# Patient Record
Sex: MALE
[De-identification: names, ages, dates, MRNs, and addresses within clinical notes are randomized per-mention and may not be internally consistent; named-entity substitution may affect disease eponyms.]

---

## 2017-10-05 ENCOUNTER — HOSPITAL ENCOUNTER (EMERGENCY)
Dept: HOSPITAL 31 - C.ER | Age: 57
Discharge: LEFT BEFORE DISCHARGE | End: 2017-10-05
Payer: MEDICARE

## 2017-10-05 VITALS
HEART RATE: 88 BPM | RESPIRATION RATE: 18 BRPM | SYSTOLIC BLOOD PRESSURE: 144 MMHG | DIASTOLIC BLOOD PRESSURE: 83 MMHG | TEMPERATURE: 97.8 F

## 2017-10-05 VITALS — BODY MASS INDEX: 28.6 KG/M2

## 2017-10-05 DIAGNOSIS — Z21: ICD-10-CM

## 2017-10-05 DIAGNOSIS — S00.93XA: Primary | ICD-10-CM
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
## 2019-02-06 ENCOUNTER — HOSPITAL ENCOUNTER (EMERGENCY)
Dept: HOSPITAL 31 - C.ER | Age: 59
Discharge: HOME | End: 2019-02-06
Payer: COMMERCIAL

## 2019-02-06 VITALS
TEMPERATURE: 98.7 F | SYSTOLIC BLOOD PRESSURE: 142 MMHG | RESPIRATION RATE: 20 BRPM | OXYGEN SATURATION: 96 % | DIASTOLIC BLOOD PRESSURE: 98 MMHG | HEART RATE: 98 BPM

## 2019-02-06 VITALS — BODY MASS INDEX: 28.6 KG/M2

## 2019-02-06 DIAGNOSIS — Z21: ICD-10-CM

## 2019-02-06 DIAGNOSIS — S43.102A: Primary | ICD-10-CM
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
## 2019-02-06 NOTE — RAD
HISTORY:

 cp s/p trauma 



COMPARISON:

None available. 



TECHNIQUE:

Chest PA and lateral



FINDINGS:





LUNGS:

The left costophrenic angles excluded from view.  No focal 

consolidation.



Please note that chest x-ray has limited sensitivity for the 

detection of pulmonary masses.



PLEURA:

No significant pleural effusion identified. No definite pneumothorax .



CARDIOVASCULAR:

The cardiomediastinal silhouette appears within normal limits of 

size.  Atherosclerotic calcification of the aortic knob present. 



OSSEOUS STRUCTURES:

No acute osseous abnormality identified.



VISUALIZED UPPER ABDOMEN:

Unremarkable.



OTHER FINDINGS:

None.



IMPRESSION:

No focal consolidation.

## 2019-02-06 NOTE — RAD
PROCEDURE:  Radiographs of the Right Shoulder



HISTORY:

trauma



COMPARISON:

None available.



FINDINGS:



BONES:

No acute displaced fracture.  Degenerative changes including 

subchondral cyst at the humeral head.  The distal clavicle and 

underlying ribs appear intact. 



JOINTS:

No acute dislocation.  Glenohumeral joint space narrowing. 



SOFT TISSUES:

Soft tissues appear unremarkable.



No evidence of radiopaque foreign body.



IMPRESSION:

No acute displaced fracture or dislocation evident. 



If symptoms persist or if there is continued clinical concern, x-ray 

follow-up in 7-10 days should be considered.

## 2019-02-06 NOTE — RAD
Date of service: 02/06/2019



PROCEDURE:  Cervical Spine Radiographs.



HISTORY:

Pain. 



COMPARISON:

None available. 



FINDINGS:

Alignment maintained.  Multilevel degenerative changes including 

intervertebral disc space narrowing, vacuum disc phenomenon, and 

osteophyte formation.  Base of the dens is poorly visualized.  No 

acute displaced fracture identified.  No prevertebral soft tissue 

swelling.  Calcifications within the right neck presumably carotid 

artery calcifications. 



IMPRESSION:

Base of the dens is poorly visualized.  Multilevel degenerative 

changes.  No acute displaced fracture identified.  Given limitations 

of this examination, CT cervical spine is recommended if high 

clinical index of suspicion for fracture. 



Multilevel degenerative changes.
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
## 2022-07-21 PROBLEM — Z00.00 ENCOUNTER FOR PREVENTIVE HEALTH EXAMINATION: Status: ACTIVE | Noted: 2022-07-21

## 2022-07-28 ENCOUNTER — APPOINTMENT (OUTPATIENT)
Dept: INFECTIOUS DISEASE | Facility: CLINIC | Age: 62
End: 2022-07-28

## 2022-07-28 ENCOUNTER — OUTPATIENT (OUTPATIENT)
Dept: OUTPATIENT SERVICES | Facility: HOSPITAL | Age: 62
LOS: 1 days | Discharge: HOME | End: 2022-07-28

## 2022-07-28 VITALS
WEIGHT: 220 LBS | HEART RATE: 85 BPM | BODY MASS INDEX: 29.8 KG/M2 | OXYGEN SATURATION: 99 % | TEMPERATURE: 98.6 F | SYSTOLIC BLOOD PRESSURE: 113 MMHG | RESPIRATION RATE: 16 BRPM | DIASTOLIC BLOOD PRESSURE: 81 MMHG | HEIGHT: 72 IN

## 2022-07-28 DIAGNOSIS — F32.A DEPRESSION, UNSPECIFIED: ICD-10-CM

## 2022-07-28 DIAGNOSIS — E78.5 HYPERLIPIDEMIA, UNSPECIFIED: ICD-10-CM

## 2022-07-28 DIAGNOSIS — K59.00 CONSTIPATION, UNSPECIFIED: ICD-10-CM

## 2022-07-28 DIAGNOSIS — F43.10 POST-TRAUMATIC STRESS DISORDER, UNSPECIFIED: ICD-10-CM

## 2022-07-28 DIAGNOSIS — I10 ESSENTIAL (PRIMARY) HYPERTENSION: ICD-10-CM

## 2022-07-28 DIAGNOSIS — B20 HUMAN IMMUNODEFICIENCY VIRUS [HIV] DISEASE: ICD-10-CM

## 2022-07-28 DIAGNOSIS — I63.9 CEREBRAL INFARCTION, UNSPECIFIED: ICD-10-CM

## 2022-07-28 DIAGNOSIS — F41.9 ANXIETY DISORDER, UNSPECIFIED: ICD-10-CM

## 2022-07-28 PROCEDURE — 99204 OFFICE O/P NEW MOD 45 MIN: CPT

## 2022-07-28 RX ORDER — ATORVASTATIN CALCIUM 40 MG/1
40 TABLET, FILM COATED ORAL
Qty: 30 | Refills: 5 | Status: ACTIVE | COMMUNITY
Start: 2022-07-28 | End: 1900-01-01

## 2022-07-28 RX ORDER — DOCUSATE SODIUM 100 MG/1
100 CAPSULE, LIQUID FILLED ORAL TWICE DAILY
Qty: 60 | Refills: 5 | Status: ACTIVE | COMMUNITY
Start: 2022-07-28 | End: 1900-01-01

## 2022-07-28 RX ORDER — OLANZAPINE 20 MG/1
20 TABLET, FILM COATED ORAL DAILY
Qty: 30 | Refills: 5 | Status: ACTIVE | COMMUNITY
Start: 2022-07-28 | End: 1900-01-01

## 2022-07-28 RX ORDER — CHOLECALCIFEROL (VITAMIN D3) 50 MCG
50 MCG TABLET ORAL DAILY
Qty: 30 | Refills: 2 | Status: ACTIVE | COMMUNITY
Start: 2022-07-28 | End: 1900-01-01

## 2022-07-28 RX ORDER — POLYETHYLENE GLYCOL 3350 17 G/17G
17 POWDER, FOR SOLUTION ORAL DAILY
Qty: 30 | Refills: 5 | Status: ACTIVE | COMMUNITY
Start: 2022-07-28 | End: 1900-01-01

## 2022-07-28 RX ORDER — PAROXETINE HYDROCHLORIDE 30 MG/1
30 TABLET, FILM COATED ORAL DAILY
Qty: 30 | Refills: 5 | Status: ACTIVE | COMMUNITY
Start: 2022-07-28 | End: 1900-01-01

## 2022-07-28 RX ORDER — FENOFIBRATE 48 MG/1
48 TABLET ORAL DAILY
Qty: 30 | Refills: 5 | Status: ACTIVE | COMMUNITY
Start: 2022-07-28 | End: 1900-01-01

## 2022-07-28 RX ORDER — FLUPHENAZINE HYDROCHLORIDE 5 MG/1
5 TABLET, FILM COATED ORAL DAILY
Qty: 30 | Refills: 5 | Status: ACTIVE | COMMUNITY
Start: 2022-07-28 | End: 1900-01-01

## 2022-07-28 RX ORDER — ASPIRIN 81 MG/1
81 TABLET, FILM COATED ORAL DAILY
Qty: 30 | Refills: 5 | Status: ACTIVE | COMMUNITY
Start: 2022-07-28 | End: 1900-01-01

## 2022-07-28 RX ORDER — CETIRIZINE HYDROCHLORIDE 10 MG/1
10 TABLET, COATED ORAL DAILY
Qty: 30 | Refills: 5 | Status: ACTIVE | COMMUNITY
Start: 2022-07-28 | End: 1900-01-01

## 2022-07-28 NOTE — HISTORY OF PRESENT ILLNESS
[FreeTextEntry1] : 60 yo M with PMH of HIV, HTN, HLD, Depression/Anxiety/PTSD who presents for initial visit \par \par HIV positive for 25 years (Heterosexual transmission) \par \par Lived in [place], moved to [place], then  [place], now in [place].\par \par He recently was hospitalized in [facility] clinic last week for chest pain. \par Reviewed discharge papers; He was recommended to follow-up with cardiologist for stress test. \par \par Otherwise, no recent fevers, chills, nausea, vomiting, abdominal pain. \par Denies any coughing or shortness of breath. \par Denies any dysuria, hematuria. \par No acute complaints. \par Reports HIV VL was around 700 3 weeks ago. \par Never told he had AIDS or had CD4 < 200. \par \par Social Hx \par Former smoker - 2 PPD in 1 week, started 1978-  quit smoking 1997 \par Denies Etoh use\par \par Family Hx - reports depression, Hx of breast cancer

## 2022-07-28 NOTE — ASSESSMENT
[FreeTextEntry1] : HIV\par - continue biktarvy \par - check annual labs\par \par Chest Pain \par - planned for Cardiology follow-up for stress test \par \par Hypertension\par - amlodipine 10 mg daily \par \par Hx of CVA 2012\par - atorvastatin 40 mg daily \par - aspirn 81 mg daily\par - Fenofibrate \par - follow-up Lipid panel \par \par Kidney Cancer 2020 - s/p removal left mass 2/11/2022\par - was seen in [facility] \par \par Bipolar/Depression/Anxiety/PTSD\par - hx of SI in late 1990s (when he found out of HIV status) and early 2000s (death of family member) \par - Paroxetin 30 mg daily \par - Olandapine 20 mg daily\par - fluphenazine 5 mg daily  \par - will give behavioral health referral - denies SI/HI here\par \par Left Shoulder Complex Derangement \par - orthopedic referral \par \par HME\par

## 2022-07-30 ENCOUNTER — EMERGENCY (EMERGENCY)
Facility: HOSPITAL | Age: 62
LOS: 0 days | Discharge: PSYCHIATRIC OR SUBSTANCE USE FACILITY | End: 2022-07-31
Attending: STUDENT IN AN ORGANIZED HEALTH CARE EDUCATION/TRAINING PROGRAM | Admitting: STUDENT IN AN ORGANIZED HEALTH CARE EDUCATION/TRAINING PROGRAM

## 2022-07-30 VITALS
TEMPERATURE: 98 F | RESPIRATION RATE: 19 BRPM | HEART RATE: 85 BPM | DIASTOLIC BLOOD PRESSURE: 82 MMHG | OXYGEN SATURATION: 95 % | SYSTOLIC BLOOD PRESSURE: 122 MMHG

## 2022-07-30 DIAGNOSIS — F31.9 BIPOLAR DISORDER, UNSPECIFIED: ICD-10-CM

## 2022-07-30 DIAGNOSIS — R45.851 SUICIDAL IDEATIONS: ICD-10-CM

## 2022-07-30 DIAGNOSIS — E78.5 HYPERLIPIDEMIA, UNSPECIFIED: ICD-10-CM

## 2022-07-30 DIAGNOSIS — B20 HUMAN IMMUNODEFICIENCY VIRUS [HIV] DISEASE: ICD-10-CM

## 2022-07-30 DIAGNOSIS — F43.10 POST-TRAUMATIC STRESS DISORDER, UNSPECIFIED: ICD-10-CM

## 2022-07-30 DIAGNOSIS — I10 ESSENTIAL (PRIMARY) HYPERTENSION: ICD-10-CM

## 2022-07-30 DIAGNOSIS — Z20.822 CONTACT WITH AND (SUSPECTED) EXPOSURE TO COVID-19: ICD-10-CM

## 2022-07-30 LAB
BASOPHILS # BLD AUTO: 0.05 K/UL — SIGNIFICANT CHANGE UP (ref 0–0.2)
BASOPHILS NFR BLD AUTO: 0.5 % — SIGNIFICANT CHANGE UP (ref 0–1)
EOSINOPHIL # BLD AUTO: 0.06 K/UL — SIGNIFICANT CHANGE UP (ref 0–0.7)
EOSINOPHIL NFR BLD AUTO: 0.6 % — SIGNIFICANT CHANGE UP (ref 0–8)
HCT VFR BLD CALC: 38.5 % — LOW (ref 42–52)
HGB BLD-MCNC: 13.7 G/DL — LOW (ref 14–18)
IMM GRANULOCYTES NFR BLD AUTO: 0.3 % — SIGNIFICANT CHANGE UP (ref 0.1–0.3)
LYMPHOCYTES # BLD AUTO: 2.39 K/UL — SIGNIFICANT CHANGE UP (ref 1.2–3.4)
LYMPHOCYTES # BLD AUTO: 25.4 % — SIGNIFICANT CHANGE UP (ref 20.5–51.1)
MCHC RBC-ENTMCNC: 31.2 PG — HIGH (ref 27–31)
MCHC RBC-ENTMCNC: 35.6 G/DL — SIGNIFICANT CHANGE UP (ref 32–37)
MCV RBC AUTO: 87.7 FL — SIGNIFICANT CHANGE UP (ref 80–94)
MONOCYTES # BLD AUTO: 0.95 K/UL — HIGH (ref 0.1–0.6)
MONOCYTES NFR BLD AUTO: 10.1 % — HIGH (ref 1.7–9.3)
NEUTROPHILS # BLD AUTO: 5.92 K/UL — SIGNIFICANT CHANGE UP (ref 1.4–6.5)
NEUTROPHILS NFR BLD AUTO: 63.1 % — SIGNIFICANT CHANGE UP (ref 42.2–75.2)
NRBC # BLD: 0 /100 WBCS — SIGNIFICANT CHANGE UP (ref 0–0)
PLATELET # BLD AUTO: 250 K/UL — SIGNIFICANT CHANGE UP (ref 130–400)
RBC # BLD: 4.39 M/UL — LOW (ref 4.7–6.1)
RBC # FLD: 13.4 % — SIGNIFICANT CHANGE UP (ref 11.5–14.5)
WBC # BLD: 9.4 K/UL — SIGNIFICANT CHANGE UP (ref 4.8–10.8)
WBC # FLD AUTO: 9.4 K/UL — SIGNIFICANT CHANGE UP (ref 4.8–10.8)

## 2022-07-30 PROCEDURE — 99285 EMERGENCY DEPT VISIT HI MDM: CPT | Mod: GC

## 2022-07-30 NOTE — ED ADULT TRIAGE NOTE - CHIEF COMPLAINT QUOTE
BIBA c/o suicidal thoughts, pt has not been taking his meds. denies HI, denies having plan. BIBA c/o suicidal thoughts, pt has not been taking his meds. denies HI, denies having plan. 1:1 initiated in triage.

## 2022-07-30 NOTE — ED ADULT NURSE NOTE - CHIEF COMPLAINT QUOTE
BIBA c/o suicidal thoughts, pt has not been taking his meds. denies HI, denies having plan. 1:1 initiated in triage.

## 2022-07-30 NOTE — ED ADULT NURSE NOTE - OBJECTIVE STATEMENT
patient complaints of SI with no plan.  Patient denies HI. Patient reports being noncompliant with medications.  Patient placed on one to  one in triage.

## 2022-07-31 ENCOUNTER — INPATIENT (INPATIENT)
Facility: HOSPITAL | Age: 62
LOS: 1 days | Discharge: HOME | DRG: 885 | End: 2022-08-02
Attending: PSYCHIATRY & NEUROLOGY | Admitting: PSYCHIATRY & NEUROLOGY
Payer: MEDICARE

## 2022-07-31 VITALS
SYSTOLIC BLOOD PRESSURE: 134 MMHG | RESPIRATION RATE: 19 BRPM | DIASTOLIC BLOOD PRESSURE: 91 MMHG | OXYGEN SATURATION: 97 % | TEMPERATURE: 97 F | HEART RATE: 83 BPM

## 2022-07-31 DIAGNOSIS — F31.9 BIPOLAR DISORDER, UNSPECIFIED: ICD-10-CM

## 2022-07-31 DIAGNOSIS — F43.10 POST-TRAUMATIC STRESS DISORDER, UNSPECIFIED: ICD-10-CM

## 2022-07-31 DIAGNOSIS — E78.5 HYPERLIPIDEMIA, UNSPECIFIED: ICD-10-CM

## 2022-07-31 DIAGNOSIS — R45.851 SUICIDAL IDEATIONS: ICD-10-CM

## 2022-07-31 DIAGNOSIS — Z98.890 OTHER SPECIFIED POSTPROCEDURAL STATES: Chronic | ICD-10-CM

## 2022-07-31 DIAGNOSIS — Z59.00 HOMELESSNESS UNSPECIFIED: ICD-10-CM

## 2022-07-31 DIAGNOSIS — Z91.51 PERSONAL HISTORY OF SUICIDAL BEHAVIOR: ICD-10-CM

## 2022-07-31 DIAGNOSIS — Z21 ASYMPTOMATIC HUMAN IMMUNODEFICIENCY VIRUS [HIV] INFECTION STATUS: ICD-10-CM

## 2022-07-31 DIAGNOSIS — I10 ESSENTIAL (PRIMARY) HYPERTENSION: ICD-10-CM

## 2022-07-31 LAB
ALBUMIN SERPL ELPH-MCNC: 4.5 G/DL — SIGNIFICANT CHANGE UP (ref 3.5–5.2)
ALP SERPL-CCNC: 75 U/L — SIGNIFICANT CHANGE UP (ref 30–115)
ALT FLD-CCNC: 18 U/L — SIGNIFICANT CHANGE UP (ref 0–41)
ANION GAP SERPL CALC-SCNC: 12 MMOL/L — SIGNIFICANT CHANGE UP (ref 7–14)
APAP SERPL-MCNC: <5 UG/ML — LOW (ref 10–30)
AST SERPL-CCNC: 19 U/L — SIGNIFICANT CHANGE UP (ref 0–41)
BILIRUB SERPL-MCNC: 0.3 MG/DL — SIGNIFICANT CHANGE UP (ref 0.2–1.2)
BUN SERPL-MCNC: 16 MG/DL — SIGNIFICANT CHANGE UP (ref 10–20)
CALCIUM SERPL-MCNC: 9 MG/DL — SIGNIFICANT CHANGE UP (ref 8.5–10.1)
CHLORIDE SERPL-SCNC: 112 MMOL/L — HIGH (ref 98–110)
CO2 SERPL-SCNC: 20 MMOL/L — SIGNIFICANT CHANGE UP (ref 17–32)
CREAT SERPL-MCNC: 1.1 MG/DL — SIGNIFICANT CHANGE UP (ref 0.7–1.5)
EGFR: 77 ML/MIN/1.73M2 — SIGNIFICANT CHANGE UP
ETHANOL SERPL-MCNC: <10 MG/DL — SIGNIFICANT CHANGE UP
GLUCOSE SERPL-MCNC: 121 MG/DL — HIGH (ref 70–99)
POTASSIUM SERPL-MCNC: 4 MMOL/L — SIGNIFICANT CHANGE UP (ref 3.5–5)
POTASSIUM SERPL-SCNC: 4 MMOL/L — SIGNIFICANT CHANGE UP (ref 3.5–5)
PROT SERPL-MCNC: 6.8 G/DL — SIGNIFICANT CHANGE UP (ref 6–8)
SALICYLATES SERPL-MCNC: <0.3 MG/DL — LOW (ref 4–30)
SARS-COV-2 RNA SPEC QL NAA+PROBE: SIGNIFICANT CHANGE UP
SODIUM SERPL-SCNC: 144 MMOL/L — SIGNIFICANT CHANGE UP (ref 135–146)

## 2022-07-31 PROCEDURE — 81001 URINALYSIS AUTO W/SCOPE: CPT

## 2022-07-31 PROCEDURE — 86803 HEPATITIS C AB TEST: CPT

## 2022-07-31 PROCEDURE — 99223 1ST HOSP IP/OBS HIGH 75: CPT

## 2022-07-31 PROCEDURE — 80061 LIPID PANEL: CPT

## 2022-07-31 PROCEDURE — 93010 ELECTROCARDIOGRAM REPORT: CPT

## 2022-07-31 PROCEDURE — 80307 DRUG TEST PRSMV CHEM ANLYZR: CPT

## 2022-07-31 PROCEDURE — 36415 COLL VENOUS BLD VENIPUNCTURE: CPT

## 2022-07-31 PROCEDURE — 87521 HEPATITIS C PROBE&RVRS TRNSC: CPT

## 2022-07-31 PROCEDURE — 83036 HEMOGLOBIN GLYCOSYLATED A1C: CPT

## 2022-07-31 PROCEDURE — 84443 ASSAY THYROID STIM HORMONE: CPT

## 2022-07-31 PROCEDURE — 86769 SARS-COV-2 COVID-19 ANTIBODY: CPT

## 2022-07-31 RX ORDER — ACETAMINOPHEN 500 MG
650 TABLET ORAL EVERY 6 HOURS
Refills: 0 | Status: DISCONTINUED | OUTPATIENT
Start: 2022-07-31 | End: 2022-08-02

## 2022-07-31 RX ORDER — LANOLIN ALCOHOL/MO/W.PET/CERES
3 CREAM (GRAM) TOPICAL AT BEDTIME
Refills: 0 | Status: DISCONTINUED | OUTPATIENT
Start: 2022-07-31 | End: 2022-08-02

## 2022-07-31 RX ORDER — OLANZAPINE 15 MG/1
10 TABLET, FILM COATED ORAL AT BEDTIME
Refills: 0 | Status: DISCONTINUED | OUTPATIENT
Start: 2022-07-31 | End: 2022-08-02

## 2022-07-31 RX ORDER — BICTEGRAVIR SODIUM, EMTRICITABINE, AND TENOFOVIR ALAFENAMIDE FUMARATE 30; 120; 15 MG/1; MG/1; MG/1
1 TABLET ORAL DAILY
Refills: 0 | Status: DISCONTINUED | OUTPATIENT
Start: 2022-07-31 | End: 2022-08-02

## 2022-07-31 RX ADMIN — OLANZAPINE 10 MILLIGRAM(S): 15 TABLET, FILM COATED ORAL at 21:16

## 2022-07-31 RX ADMIN — BICTEGRAVIR SODIUM, EMTRICITABINE, AND TENOFOVIR ALAFENAMIDE FUMARATE 1 TABLET(S): 30; 120; 15 TABLET ORAL at 22:00

## 2022-07-31 RX ADMIN — Medication 3 MILLIGRAM(S): at 21:16

## 2022-07-31 SDOH — ECONOMIC STABILITY - HOUSING INSECURITY: HOMELESSNESS UNSPECIFIED: Z59.00

## 2022-07-31 NOTE — BH SOCIAL WORK INITIAL PSYCHOSOCIAL EVALUATION - DETAILS
Pt reports family hx of MH and substance abuse Mother, extended family with Bipolar, MH, depression, substance abuse

## 2022-07-31 NOTE — BH SOCIAL WORK INITIAL PSYCHOSOCIAL EVALUATION - OTHER PAST PSYCHIATRIC HISTORY (INCLUDE DETAILS REGARDING ONSET, COURSE OF ILLNESS, INPATIENT/OUTPATIENT TREATMENT)
Patient reports mult admissions for BH over course of lifetime. Most recent inpt stay in [place], [place]

## 2022-07-31 NOTE — ED PROVIDER NOTE - PHYSICAL EXAMINATION
CONSTITUTIONAL: Well-developed; well-nourished; in no acute distress.   SKIN: warm, dry. no rashes.  HEAD: Normocephalic; atraumatic.  EYES: PERRL, EOMI, no conjunctival erythema  ENT: No nasal discharge; airway clear. mmm.  NECK: Supple; non tender.  CARD: S1, S2 normal; no murmurs, gallops, or rubs. Regular rate and rhythm.   RESP: No wheezes, rales or rhonchi. lungs ctab.  ABD: soft ntnd; no masses, rebound, or guarding.  EXT: Normal ROM.  No clubbing, cyanosis or edema.  NEURO: Alert, oriented, grossly unremarkable. CNs 2-12 grossly intact. normal motor/strength/sensation. no fnd. ambulating with a steady gait.

## 2022-07-31 NOTE — ED BEHAVIORAL HEALTH ASSESSMENT NOTE - RISK ASSESSMENT
High Acute Suicide Risk Mod: Depression, SI  Unmod: Housing issues, hx of suicide attempts, male gender, age  Protective: Duty to family    Elevated risk by history and current symptoms, will benefit from admission

## 2022-07-31 NOTE — BH SOCIAL WORK INITIAL PSYCHOSOCIAL EVALUATION - NSCMSPTSTRENGTHS_PSY_ALL_CORE
Able to adapt/Compliance to treatment/Expressive of emotions/Daysi/spirituality/Flexibility/Future/goal oriented/Highly motivated for treatment/Motivated/Self-reliant

## 2022-07-31 NOTE — BH SOCIAL WORK INITIAL PSYCHOSOCIAL EVALUATION - NSPTSTATEDGOAL_PSY_ALL_CORE
Patient reports he has faced more hardships in his life than most and is working hard to get an apartment and work in limits of SSDI.

## 2022-07-31 NOTE — BH INPATIENT PSYCHIATRY ASSESSMENT NOTE - NSBHCHARTREVIEWVS_PSY_A_CORE FT
Vital Signs Last 24 Hrs  T(C): 36.3 (07-31-22 @ 08:07), Max: 36.6 (07-30-22 @ 22:16)  T(F): 97.4 (07-31-22 @ 08:07), Max: 97.9 (07-30-22 @ 22:16)  HR: 83 (07-31-22 @ 08:07) (83 - 85)  BP: 134/91 (07-31-22 @ 08:07) (122/82 - 134/91)  BP(mean): --  RR: 19 (07-31-22 @ 08:07) (19 - 19)  SpO2: 97% (07-31-22 @ 08:07) (95% - 97%)

## 2022-07-31 NOTE — ED BEHAVIORAL HEALTH ASSESSMENT NOTE - PSYCHIATRIC ISSUES AND PLAN (INCLUDE STANDING AND PRN MEDICATION)
C/w Prolixin, Paxil, Zyprexa (Damián not aware of doses, will start at low doses); PRN Haldol/Ativan

## 2022-07-31 NOTE — ED PROVIDER NOTE - OBJECTIVE STATEMENT
61-year-old male with past medical history of hypertension, hyperlipidemia, bipolar disorder, and PTSD presents to the emergency department with suicidal ideation.  Patient reports he was walking by the water today and had thoughts of killing himself.  Patient had previous suicidal thoughts in the past and had 2 prior suicide attempts many years ago.  Patient reports occasionally hearing voices telling him to kill himself.  Denies any homicidal ideation.  No visual hallucinations.  Denies any substance abuse.

## 2022-07-31 NOTE — BH INPATIENT PSYCHIATRY ASSESSMENT NOTE - SUICIDE ATTEMPT:
Protocol For Photochemotherapy: Mineral Oil And Nbuvb: The patient received Photochemotherapy: Mineral Oil and NBUVB (mineral oil applied to all lesions prior to phototherapy). Protocol For Photochemotherapy: Tar And Nbuvb (Goeckerman Treatment): The patient received Photochemotherapy: Tar and NBUVB (Goeckerman treatment). Protocol For Photochemotherapy: Tar And Broad Band Uvb (Goeckerman Treatment): The patient received Photochemotherapy: Tar and Broad Band UVB (Goeckerman treatment). Protocol For Photochemotherapy: Mineral Oil And Broad Band Uvb: The patient received Photochemotherapy: Mineral Oil and Broad Band UVB. Protocol For Photochemotherapy: Triamcinolone Ointment And Nbuvb: The patient received Photochemotherapy: Triamcinolone and NBUVB (triamcinolone ointment applied to all lesions prior to phototherapy). Detail Level: Zone Render Post-Care In The Note: no Protocol For Photochemotherapy: Petrolatum And Broad Band Uvb: The patient received Photochemotherapy: Petrolatum and Broad Band UVB. Total Treatment Time: 3:45 Consent: Written consent obtained.  The risks were reviewed with the patient including but not limited to: burn, pigmentary changes, pain, blistering, scabbing, redness, increased risk of skin cancers, and the remote possibility of scarring. Yes > 3 months ago Protocol For Uva: The patient received UVA. Name Of Supervising Technician: Irma Protocol: Photochemotherapy: Petrolatum and NBUVB Protocol For Photochemotherapy For Severe Photoresponsive Dermatoses: Tar And Nbuvb (Goeckerman Treatment): The patient received Photochemotherapy for severe photoresponsive dermatoses: Tar and NBUVB (Goeckerman treatment) requiring at least 4 to 8 hours of care under direct physician supervision. Protocol For Uva1: The patient received UVA1. Protocol For Nbuvb: The patient received NBUVB. Skin Type: II Post-Care Instructions: I reviewed with the patient in detail post-care instructions. Patient is to wear sun protection. Patients may expect sunburn like redness, discomfort and scabbing. Protocol For Puva: The patient received PUVA. Protocol For Photochemotherapy For Severe Photoresponsive Dermatoses: Petrolatum And Nbuvb: The patient received Photochemotherapy for severe photoresponsive dermatoses: Petrolatum and NBUVB requiring at least 4 to 8 hours of care under direct physician supervision. Protocol For Photochemotherapy For Severe Photoresponsive Dermatoses: Puva: The patient received Photochemotherapy for severe photoresponsive dermatoses: PUVA requiring at least 4 to 8 hours of care under direct physician supervision. Protocol For Nb Uva: The patient received NB UVA. Protocol For Protocol For Photochemotherapy For Severe Photoresponsive Dermatoses: Bath Puva: The patient received Photochemotherapy for severe photoresponsive dermatoses: Bath PUVA requiring at least 4 to 8 hours of care under direct physician supervision. Protocol For Broad Band Uvb: The patient received Broad Band UVB. Protocol For Bath Puva: The patient received Bath PUVA. Protocol For Photochemotherapy For Severe Photoresponsive Dermatoses: Tar And Broad Band Uvb (Goeckerman Treatment): The patient received Photochemotherapy for severe photoresponsive dermatoses: Tar and Broad Band UVB (Goeckerman treatment) requiring at least 4 to 8 hours of care under direct physician supervision. Protocol For Photochemotherapy For Severe Photoresponsive Dermatoses: Petrolatum And Broad Band Uvb: The patient received Photochemotherapyfor severe photoresponsive dermatoses: Petrolatum and Broad Band UVB requiring at least 4 to 8 hours of care under direct physician supervision. Treatment Number: 52 Protocol For Photochemotherapy: Petrolatum And Nbuvb: The patient received Photochemotherapy: Petrolatum and NBUVB (petrolatum applied to all lesions prior to phototherapy). Protocol For Photochemotherapy: Baby Oil And Nbuvb: The patient received Photochemotherapy: Baby Oil and NBUVB (baby oil applied to all lesions prior to phototherapy).

## 2022-07-31 NOTE — BH INPATIENT PSYCHIATRY ASSESSMENT NOTE - RISK ASSESSMENT
Mod: Depression, SI  Un-mood: Housing issues, hx of suicide attempts, male gender, age  Protective: Duty to family    Elevated risk by history and current symptoms, will benefit from admission

## 2022-07-31 NOTE — BH INPATIENT PSYCHIATRY ASSESSMENT NOTE - OTHER PAST PSYCHIATRIC HISTORY (INCLUDE DETAILS REGARDING ONSET, COURSE OF ILLNESS, INPATIENT/OUTPATIENT TREATMENT)
Multiple admissions, last several months ago    Scheduled to begin care at [facility] clinic later in August 2022
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
## 2022-07-31 NOTE — ED PROVIDER NOTE - NS ED ATTENDING STATEMENT MOD
This was a shared visit with the HAMIDA. I reviewed and verified the documentation and independently performed the documented:

## 2022-07-31 NOTE — ED BEHAVIORAL HEALTH ASSESSMENT NOTE - HPI (INCLUDE ILLNESS QUALITY, SEVERITY, DURATION, TIMING, CONTEXT, MODIFYING FACTORS, ASSOCIATED SIGNS AND SYMPTOMS)
Damián is a 62yo man (domiciled in shelter, single, retired) with PPhx of Bipolar DO and PTSD (reportedly multiple admissions, most recent several months ago; hx 2 suicide attempts) and PMHx of HTN, HLD. He was BIBEMS with complaint of suicidal ideation.    Damián was interviewed in private area on 1:1. He was calm, pleasant, easily engaged in conversation. He was not agitated and did not require IM medication. He reported feeling depressed and dispirited lately due to issues with his housing and his frustrations with his health. He stated he had recently moved shelters to [place] and was being harassed by others in his house. He stated he felt depressed regarding his HIV status as well. He disclosed thoughts of wanting to jump from a bridge or to drown himself at the beach. He stated these feelings were coming despite compliance with medication - Paxil, Zyprexa Damián is a 62yo man (domiciled in shelter, single, retired) with PPhx of Bipolar DO and PTSD (reportedly multiple admissions, most recent several months ago; hx 2 suicide attempts) and PMHx of HTN, HLD. He was BIBEMS with complaint of suicidal ideation.    Damián was interviewed in private area on 1:1. He was calm, pleasant, easily engaged in conversation. He was not agitated and did not require IM medication. He reported feeling depressed and dispirited lately due to issues with his housing and his frustrations with his health. He stated he had recently moved shelters to [place] and was being harassed by others in his house. He stated he felt depressed regarding his HIV status as well. He disclosed thoughts of wanting to jump from a bridge or to drown himself at the beach. He stated these feelings were coming despite compliance with medication - Paxil, Zyprexa, Prolixin. He reported poor sleep and very poor appetite lately. 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 Damián expressed some ambivalence about suicide - cited his daughter and granddaughters as protective factors - but was not certain how he would improve his mood. He was in agreement to sign into hospital voluntarily.    COVID screen - vaccinated, no travel, no recent exposure, no recent infection
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
## 2022-07-31 NOTE — ED BEHAVIORAL HEALTH NOTE - BEHAVIORAL HEALTH NOTE
==================  PRE-HOSPITAL COURSE  ===================    SOURCE:  RN and secondhand ED documentation  DETAILS:  Per chart, patient self-presented to the ED due to experiencing SI with thoughts of wanting to jump off of a bridge.      =========  ED COURSE  =========  SOURCE:  Secondhand ED documentation.  ARRIVAL:  Per chart, patient arrived as a walk in, noted to be calm upon arrival without triage issues.  BELONGINGS:  No belongings of note. All belongings were provided to hospital security, and patient currently in a gown with a 1:1 staff member.  BEHAVIOR: Per ED documentation, patient is noted to be calm and cooperative with linear thought process, medically stable, expresses SI with thoughts of wanting to jump off of a bridge, denies HI/AVH, presents with good hygiene, no marks/lacerations noted on skin.   TREATMENT:  None  VISITORS:  None    ================  FOR EACH COLLATERAL   ================    NAME:  Damari Lee  NUMBER: [phone]  RELATIONSHIP: Friend  RELIABILITY: Limited but reliable    Patient is a 61M currently shelter domiciled, single, with one adult child, unemployed, PPHx/o depression with one past IPP admission, PMHx/o HBP, Hx/o substance use 25yrs ago, not currently enrolled in outpatient treatment, no legal hx, no known access to firearms. Friend stated that she was made aware that the pt self-presented to the ED for thoughts of SI, unclear to friend if pt had a plan or acted on the SI. Friend states that one major stressor is housing, as pt currently lives in a shelter and is dissatisfied with current living conditions. Friend states that at baseline patient is calm, with linear thought process, and denies violence/aggression. Friend denies safety concerns in the event pt is psychiatrically cleared. Friend was unable to provide further collateral information.

## 2022-07-31 NOTE — BH SAFETY PLAN - WARNING SIGN 2
Triggers: Living in a shelter, uncertain living conditions, HIV positive, financial issues, physical pain in knee, shoulders and back

## 2022-07-31 NOTE — BH INPATIENT PSYCHIATRY ASSESSMENT NOTE - PATIENT'S CHIEF COMPLAINT
O-Z Flap Text: The defect edges were debeveled with a #15 scalpel blade.  Given the location of the defect, shape of the defect and the proximity to free margins an O-Z flap was deemed most appropriate.  Using a sterile surgical marker, an appropriate transposition flap was drawn incorporating the defect and placing the expected incisions within the relaxed skin tension lines where possible. The area thus outlined was incised deep to adipose tissue with a #15 scalpel blade.  The skin margins were undermined to an appropriate distance in all directions utilizing iris scissors. "I've been having suicidal thoughts"

## 2022-07-31 NOTE — BH INPATIENT PSYCHIATRY ASSESSMENT NOTE - HPI (INCLUDE ILLNESS QUALITY, SEVERITY, DURATION, TIMING, CONTEXT, MODIFYING FACTORS, ASSOCIATED SIGNS AND SYMPTOMS)
Damián is a 62yo man (domiciled in shelter, single, retired) with PPhx of Bipolar DO and PTSD (reportedly multiple admissions, most recent several months ago; hx 2 suicide attempts) and PMHx of HTN, HLD. He was BIBEMS with complaint of suicidal ideation.    Damián was interviewed in private area on 1:1. He was calm, pleasant, easily engaged in conversation. He was not agitated and did not require IM medication. He reported feeling depressed and dispirited lately due to issues with his housing and his frustrations with his health. He stated he had recently moved shelters to [place] and was being harassed by others in his house. He stated he felt depressed regarding his HIV status as well. He disclosed thoughts of wanting to jump from a bridge or to drown himself at the beach. He stated these feelings were coming despite compliance with medication - Paxil, Zyprexa, Prolixin. He reported poor sleep and very poor appetite lately. 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 Damián expressed some ambivalence about suicide - cited his daughter and granddaughters as protective factors - but was not certain how he would improve his mood. He was in agreement to sign into hospital voluntarily.    COVID screen - vaccinated, no travel, no recent exposure, no recent infection
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
## 2022-07-31 NOTE — ED BEHAVIORAL HEALTH ASSESSMENT NOTE - DESCRIPTION
Calm, cooperative    Vital Signs - Last 24 Hrs    T(C): 36.6 (07-30-22 @ 22:16), Max: 36.6 (07-30-22 @ 22:16)  HR: 85 (07-30-22 @ 22:16) (85 - 85)  BP: 122/82 (07-30-22 @ 22:16) (122/82 - 122/82)  RR: 19 (07-30-22 @ 22:16) (19 - 19)  SpO2: 95% (07-30-22 @ 22:16) (95% - 95%)  Wt(kg): --  Daily     Daily     I&O's Summary Retired/on SSI-D, father to 31yo daughter, grandfather to 2 grandchildren HIV, HTN, HLD

## 2022-07-31 NOTE — ED PROVIDER NOTE - ATTENDING APP SHARED VISIT CONTRIBUTION OF CARE
61-year-old male with past medical history hypertension, hyperlipidemia, bipolar disorder and PTSD presents to the emergency department with suicidal ideation.  Patient reports he was walking by the water today and had thoughts of killing himself.  Patient had previous thoughts in the past and had 2 prior suicide attempts many years ago.  Patient reports occasionally hearing voices telling him to kill himself.  Denies any homicidal ideation.  No visual hallucinations.  Denies any substance abuse.    No fever, nausea, vomiting, chest pain, sob, palpitations, diaphoresis, cough, headache, dizziness, numbness/tingling, abdominal pain, diarrhea, constipation, trauma, weakness, edema, calf pain or swelling, sick contacts, recent travel or rash.     Vital Signs: I have reviewed the initial vital signs.  Constitutional: Patient in no acute distress.  Integumentary: No rash.  ENT: MMM  NECK: Supple.   Cardiovascular: RRR, radial pulses 2/4 bilaterally.   Respiratory: Breath sounds CTA b/l, no wheezing or crackles, good air exchange, good resp effort and excursion, no accessory muscle use, no stridor.  Gastrointestinal: Abdomen soft, non-tender, non-distended, no rebound tenderness or guarding, no CVAT.   Musculoskeletal: FROM, no edema, no calf pain/swelling/erythema.  Neurologic: AAOx3, motor 5/5 and sensation intact throughout upper and lower ext, No focal deficits.

## 2022-07-31 NOTE — ED PROVIDER NOTE - CARE PLAN
Assessment and plan of treatment:	plan: 1:1, labs, ekg, psych eval, reassess   1 Principal Discharge DX:	Suicidal ideation  Assessment and plan of treatment:	plan: 1:1, labs, ekg, psych eval, reassess

## 2022-07-31 NOTE — ED PROVIDER NOTE - CLINICAL SUMMARY MEDICAL DECISION MAKING FREE TEXT BOX
61-year-old male presenting with suicidal ideation.  Patient placed on one-to-one constant observation, labs EKG unremarkable.  Patient evaluated by psychiatry, recommending admission. Patient will be voluntarily admitted at this time to [facility]. I have fully discussed the medical management and delivery of care with the patient. I have discussed any available labs, imaging and treatment options with the patient.  Pt admitted for further care & management.

## 2022-07-31 NOTE — ED PROVIDER NOTE - PROGRESS NOTE DETAILS
Authored by Cindy Coon DO: spoke to telepsych, pt will be voluntary admitted, bed available at [facility], paperwork will be completed Authored by Cindy Coon, DO: Voluntary paperwork completed transfer forms completed , rn aware Authored by Cindy Coon DO: Endorsed to Dr. Hunt pending transfer

## 2022-07-31 NOTE — ED BEHAVIORAL HEALTH ASSESSMENT NOTE - OTHER PAST PSYCHIATRIC HISTORY (INCLUDE DETAILS REGARDING ONSET, COURSE OF ILLNESS, INPATIENT/OUTPATIENT TREATMENT)
Multiple admissions, last several months ago    Scheduled to begin care at [facility] clinic later in August 2022

## 2022-07-31 NOTE — BH SOCIAL WORK INITIAL PSYCHOSOCIAL EVALUATION - NSBHCHILDBEHAVIOR_PSY_ALL_CORE
Mother would not allow friends due to [employer] rules of Jehovah witness./Conflict with parents/No friends

## 2022-08-01 VITALS — TEMPERATURE: 98 F | OXYGEN SATURATION: 99 % | RESPIRATION RATE: 18 BRPM

## 2022-08-01 LAB
A1C WITH ESTIMATED AVERAGE GLUCOSE RESULT: 5.4 % — SIGNIFICANT CHANGE UP (ref 4–5.6)
APPEARANCE UR: CLEAR — SIGNIFICANT CHANGE UP
BILIRUB UR-MCNC: NEGATIVE — SIGNIFICANT CHANGE UP
CHOLEST SERPL-MCNC: 170 MG/DL — SIGNIFICANT CHANGE UP
COLOR SPEC: YELLOW — SIGNIFICANT CHANGE UP
DIFF PNL FLD: ABNORMAL
ESTIMATED AVERAGE GLUCOSE: 108 MG/DL — SIGNIFICANT CHANGE UP (ref 68–114)
GLUCOSE UR QL: NEGATIVE — SIGNIFICANT CHANGE UP
HDLC SERPL-MCNC: 41 MG/DL — SIGNIFICANT CHANGE UP
KETONES UR-MCNC: NEGATIVE — SIGNIFICANT CHANGE UP
LEUKOCYTE ESTERASE UR-ACNC: NEGATIVE — SIGNIFICANT CHANGE UP
LIPID PNL WITH DIRECT LDL SERPL: 115 MG/DL — HIGH
NITRITE UR-MCNC: NEGATIVE — SIGNIFICANT CHANGE UP
NON HDL CHOLESTEROL: 128 MG/DL — SIGNIFICANT CHANGE UP
PCP SPEC-MCNC: SIGNIFICANT CHANGE UP
PH UR: 6 — SIGNIFICANT CHANGE UP (ref 5–8)
PROT UR-MCNC: NEGATIVE — SIGNIFICANT CHANGE UP
SP GR SPEC: 1.01 — SIGNIFICANT CHANGE UP (ref 1.01–1.02)
TRIGL SERPL-MCNC: 68 MG/DL — SIGNIFICANT CHANGE UP
TSH SERPL-MCNC: 0.77 UU/ML — SIGNIFICANT CHANGE UP (ref 0.34–4.82)
UROBILINOGEN FLD QL: NEGATIVE — SIGNIFICANT CHANGE UP

## 2022-08-01 PROCEDURE — 99223 1ST HOSP IP/OBS HIGH 75: CPT

## 2022-08-01 PROCEDURE — 99231 SBSQ HOSP IP/OBS SF/LOW 25: CPT

## 2022-08-01 RX ORDER — AMLODIPINE BESYLATE 2.5 MG/1
5 TABLET ORAL DAILY
Refills: 0 | Status: DISCONTINUED | OUTPATIENT
Start: 2022-08-01 | End: 2022-08-02

## 2022-08-01 RX ORDER — FENOFIBRATE,MICRONIZED 130 MG
48 CAPSULE ORAL DAILY
Refills: 0 | Status: DISCONTINUED | OUTPATIENT
Start: 2022-08-01 | End: 2022-08-02

## 2022-08-01 RX ADMIN — Medication 650 MILLIGRAM(S): at 10:28

## 2022-08-01 RX ADMIN — Medication 650 MILLIGRAM(S): at 09:28

## 2022-08-01 RX ADMIN — BICTEGRAVIR SODIUM, EMTRICITABINE, AND TENOFOVIR ALAFENAMIDE FUMARATE 1 TABLET(S): 30; 120; 15 TABLET ORAL at 21:29

## 2022-08-01 RX ADMIN — Medication 10 MILLIGRAM(S): at 09:27

## 2022-08-01 RX ADMIN — OLANZAPINE 10 MILLIGRAM(S): 15 TABLET, FILM COATED ORAL at 21:29

## 2022-08-01 NOTE — H&P ADULT - HISTORY OF PRESENT ILLNESS
61 year old male with history of depression, HIV, HLD and HTN presented to the ED with Suicidal thoughts. Currently patient on the Psych floor being evaluated for suicidal thoughts.   Currently he denies any homicidal or suicidal thoughts. States he is compliant with his medication and regularly follows up with his PCP in [place]. No chest pain or sob.  No fever, voiding well and tolerating po.

## 2022-08-01 NOTE — H&P ADULT - ASSESSMENT
- 61 year old male with history of depression, HIV, HLD and HTN presented to the ED with Suicidal thoughts.    # Suicidal Thoughts/Depression  - Continue care as per psych    # HIV  - Continue Biktarvy  - FU with PCP/HIV specialist as outpatient  - Compliance discussed    # HTN  - Continue Amlodipine   - Monitor BP 2x day    # HLD  - Continue Fenofibrate  - LFT within normal limits    Patient currently stable from a medical standpoint  Continue medications as prescribed  FU with HIV/PCP physician as out patient  Will sign off please reconsult if needed

## 2022-08-01 NOTE — H&P ADULT - NSHPLABSRESULTS_GEN_ALL_CORE
LABS:                            13.7   9.40  )-----------( 250      ( 2022 23:20 )             38.5     07-30    144  |  112<H>  |  16  ----------------------------<  121<H>  4.0   |  20  |  1.1    Ca    9.0      2022 23:20    TPro  6.8  /  Alb  4.5  /  TBili  0.3  /  DBili  x   /  AST  19  /  ALT  18  /  AlkPhos  75  07-30        LIVER FUNCTIONS - ( 2022 23:20 )  Alb: 4.5 g/dL / Pro: 6.8 g/dL / ALK PHOS: 75 U/L / ALT: 18 U/L / AST: 19 U/L / GGT: x             Urinalysis Basic - ( 01 Aug 2022 09:11 )  Color: Yellow / Appearance: Clear / S.015 / pH: x  Gluc: x / Ketone: Negative  / Bili: Negative / Urobili: Negative   Blood: x / Protein: Negative / Nitrite: Negative   Leuk Esterase: Negative / RBC: 0-2 /HPF / WBC 0-2   Sq Epi: x / Non Sq Epi: Negative / Bacteria: Negative

## 2022-08-01 NOTE — H&P ADULT - NSHPPHYSICALEXAM_GEN_ALL_CORE
RR: 18 (08-01-22 @ 08:03) (18 - 18)  SpO2: 99% (08-01-22 @ 08:03) (99% - 99%)T(F): 97.7 (08-01-22 @ 08:03), Max: 97.7 (08-01-22 @ 08:03)    PHYSICAL EXAM:  GEN: Pleasant, NAD  HEENT:  pupils equal and reactive, EOMI, no oropharyngeal lesions, erythema, exudates, oral thrush  NECK:   supple, no carotid bruits, no palpable lymph nodes, no thyromegaly  CV:  +S1, +S2, regular, no murmurs or rubs  RESP:   lungs clear to auscultation bilaterally, no wheezing, rales, rhonchi, good air entry bilaterally  BREAST:  not examined  GI:  abdomen soft, non-tender, non-distended, normal BS, no bruits, no abdominal masses, no palpable masses  RECTAL:  not examined  :  not examined  MSK:   normal muscle tone, no atrophy, no rigidity, no contractions  EXT:  no clubbing, no cyanosis, no edema, no calf pain, swelling or erythema  VASCULAR:  pulses equal and symmetric in the upper and lower extremities  NEURO:  AAOX3, no focal neurological deficits, follows all commands, able to move extremities spontaneously  SKIN:  no ulcers, lesions or rashes

## 2022-08-02 VITALS — OXYGEN SATURATION: 98 % | TEMPERATURE: 98 F | RESPIRATION RATE: 16 BRPM

## 2022-08-02 PROBLEM — B20 HUMAN IMMUNODEFICIENCY VIRUS [HIV] DISEASE: Chronic | Status: ACTIVE | Noted: 2022-07-31

## 2022-08-02 PROBLEM — Z98.890 OTHER SPECIFIED POSTPROCEDURAL STATES: Chronic | Status: ACTIVE | Noted: 2022-07-31

## 2022-08-02 LAB
HCV AB S/CO SERPL IA: 12.63 S/CO — HIGH (ref 0–0.99)
HCV AB SERPL-IMP: REACTIVE

## 2022-08-02 PROCEDURE — 99238 HOSP IP/OBS DSCHRG MGMT 30/<: CPT

## 2022-08-02 RX ORDER — AMLODIPINE BESYLATE 2.5 MG/1
1 TABLET ORAL
Qty: 0 | Refills: 0 | DISCHARGE
Start: 2022-08-02

## 2022-08-02 RX ORDER — FENOFIBRATE,MICRONIZED 130 MG
1 CAPSULE ORAL
Qty: 0 | Refills: 0 | DISCHARGE

## 2022-08-02 RX ORDER — FENOFIBRATE,MICRONIZED 130 MG
1 CAPSULE ORAL
Qty: 0 | Refills: 0 | DISCHARGE
Start: 2022-08-02

## 2022-08-02 RX ORDER — OLANZAPINE 15 MG/1
1 TABLET, FILM COATED ORAL
Qty: 0 | Refills: 0 | DISCHARGE
Start: 2022-08-02

## 2022-08-02 RX ORDER — BICTEGRAVIR SODIUM, EMTRICITABINE, AND TENOFOVIR ALAFENAMIDE FUMARATE 30; 120; 15 MG/1; MG/1; MG/1
1 TABLET ORAL
Qty: 0 | Refills: 0 | DISCHARGE
Start: 2022-08-02

## 2022-08-02 RX ORDER — AMLODIPINE BESYLATE 2.5 MG/1
1 TABLET ORAL
Qty: 0 | Refills: 0 | DISCHARGE

## 2022-08-02 RX ADMIN — Medication 48 MILLIGRAM(S): at 09:16

## 2022-08-02 RX ADMIN — AMLODIPINE BESYLATE 5 MILLIGRAM(S): 2.5 TABLET ORAL at 09:16

## 2022-08-02 RX ADMIN — Medication 10 MILLIGRAM(S): at 09:16

## 2022-08-02 NOTE — BH TREATMENT PLAN - NSTXPLANTHERAPYSESSIONSFT_PSY_ALL_CORE
07-31-22  Type of therapy: Safety planning  Type of session: Individual  Level of patient participation: Engaged  Duration of participation: 15 minutes  --  --    08-01-22  Type of therapy: Other  Type of session: N/A  Level of patient participation: Resistance to participation  Duration of participation: 0 minutes  Therapy conducted by: Psych rehab  Therapy Summary: Patient declined to attend group programming although encouraged.

## 2022-08-02 NOTE — BH INPATIENT PSYCHIATRY PROGRESS NOTE - NSBHCHARTREVIEWLAB_PSY_A_CORE FT
13.7   9.40  )-----------( 250      ( 30 Jul 2022 23:20 )             38.5   07-30    144  |  112<H>  |  16  ----------------------------<  121<H>  4.0   |  20  |  1.1    Ca    9.0      30 Jul 2022 23:20    TPro  6.8  /  Alb  4.5  /  TBili  0.3  /  DBili  x   /  AST  19  /  ALT  18  /  AlkPhos  75  07-30  
                      13.7   9.40  )-----------( 250      ( 30 Jul 2022 23:20 )             38.5   07-30    144  |  112<H>  |  16  ----------------------------<  121<H>  4.0   |  20  |  1.1    Ca    9.0      30 Jul 2022 23:20    TPro  6.8  /  Alb  4.5  /  TBili  0.3  /  DBili  x   /  AST  19  /  ALT  18  /  AlkPhos  75  07-30
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
## 2022-08-02 NOTE — BH INPATIENT PSYCHIATRY PROGRESS NOTE - CURRENT MEDICATION
MEDICATIONS  (STANDING):  amLODIPine   Tablet 5 milliGRAM(s) Oral daily  bictegravir 50 mG/emtricitabine 200 mG/tenofovir alafenamide 25 mG (BIKTARVY) 1 Tablet(s) Oral daily  fenofibrate Tablet 48 milliGRAM(s) Oral daily  OLANZapine 10 milliGRAM(s) Oral at bedtime  PARoxetine 10 milliGRAM(s) Oral daily    MEDICATIONS  (PRN):  acetaminophen     Tablet .. 650 milliGRAM(s) Oral every 6 hours PRN Temp greater or equal to 38C (100.4F), Moderate Pain (4 - 6)  aluminum hydroxide/magnesium hydroxide/simethicone Suspension 30 milliLiter(s) Oral every 4 hours PRN Dyspepsia  LORazepam     Tablet 1 milliGRAM(s) Oral every 6 hours PRN Mod agitation  melatonin 3 milliGRAM(s) Oral at bedtime PRN Insomnia  
MEDICATIONS  (STANDING):  bictegravir 50 mG/emtricitabine 200 mG/tenofovir alafenamide 25 mG (BIKTARVY) 1 Tablet(s) Oral daily  OLANZapine 10 milliGRAM(s) Oral at bedtime  PARoxetine 10 milliGRAM(s) Oral daily    MEDICATIONS  (PRN):  acetaminophen     Tablet .. 650 milliGRAM(s) Oral every 6 hours PRN Temp greater or equal to 38C (100.4F), Moderate Pain (4 - 6)  aluminum hydroxide/magnesium hydroxide/simethicone Suspension 30 milliLiter(s) Oral every 4 hours PRN Dyspepsia  LORazepam     Tablet 1 milliGRAM(s) Oral every 6 hours PRN Mod agitation  melatonin 3 milliGRAM(s) Oral at bedtime PRN Insomnia

## 2022-08-02 NOTE — BH INPATIENT PSYCHIATRY PROGRESS NOTE - PRN MEDS
MEDICATIONS  (PRN):  acetaminophen     Tablet .. 650 milliGRAM(s) Oral every 6 hours PRN Temp greater or equal to 38C (100.4F), Moderate Pain (4 - 6)  aluminum hydroxide/magnesium hydroxide/simethicone Suspension 30 milliLiter(s) Oral every 4 hours PRN Dyspepsia  LORazepam     Tablet 1 milliGRAM(s) Oral every 6 hours PRN Mod agitation  melatonin 3 milliGRAM(s) Oral at bedtime PRN Insomnia  
MEDICATIONS  (PRN):  acetaminophen     Tablet .. 650 milliGRAM(s) Oral every 6 hours PRN Temp greater or equal to 38C (100.4F), Moderate Pain (4 - 6)  aluminum hydroxide/magnesium hydroxide/simethicone Suspension 30 milliLiter(s) Oral every 4 hours PRN Dyspepsia  LORazepam     Tablet 1 milliGRAM(s) Oral every 6 hours PRN Mod agitation  melatonin 3 milliGRAM(s) Oral at bedtime PRN Insomnia

## 2022-08-02 NOTE — BH INPATIENT PSYCHIATRY DISCHARGE NOTE - NSDCMRMEDTOKEN_GEN_ALL_CORE_FT
amLODIPine 5 mg oral tablet: 1 tab(s) orally once a day  bictegravir/emtricitabine/tenofovir 50 mg-200 mg-25 mg oral tablet: 1 tab(s) orally once a day  fenofibrate 48 mg oral tablet: 1 tab(s) orally once a day  OLANZapine 10 mg oral tablet: 1 tab(s) orally once a day (at bedtime)  PARoxetine 10 mg oral tablet: 1 tab(s) orally once a day

## 2022-08-02 NOTE — BH INPATIENT PSYCHIATRY PROGRESS NOTE - NSBHADMITMEDEDUDETAILS_A_CORE FT
Discussed meds s/e and long term benefits and he later agreed to re-start Paxil/Zyprexa 10 mg/day. Was also advised to join groups and other in-patient activities for stability/safety.
Discussed meds s/e and long term benefits and he later agreed to re-start Paxil/Zyprexa 10 mg/day. Was also advised to join groups and other in-patient activities for stability/safety.

## 2022-08-02 NOTE — BH TREATMENT PLAN - NSTXDCOPLKINTERSW_PSY_ALL_CORE
Psych informed SW that pt can be dc tomorrow. SW met with pt, pt [facility] [address] [facility] [facility] [address]. Pt reports that he has a bus trip scheduled for 8/3 so he can see a new apt and that is why he is eager to leave. Pt reported that he has an appt scheduled for 8/11 with [facility] but is unsure of psych. Pt gave verbal consent for SW to speak with [facility] to inquire on appt. SW spoke with Tristan Perez [phone] @ [facility], pt has a tentative appt for 8/11 but cannot confirm it until he agrees to pay $100 copay. SW informed pt who reported that he does not have a copay with his new insurance, [employer] FRANCIS. SW and pt contacted [employer], pt does not have a copay and plan is in effect as of today 8/1. SW and pt spoke with Tristan Benito who reported that their insurance dept does not see the [employer] as active and that [facility] is still active. Tristan Perez reports that insurance dept leaves @ 2pm and will have to f/u with SW tomorrow after [facility] is contacted. Clinic is not in network with pt's previous insurance. Pt reports he is willing to pay out of pocket if needed until insurance issue is sorted. SW awaiting callback from [facility] with insurance determination.

## 2022-08-02 NOTE — BH INPATIENT PSYCHIATRY DISCHARGE NOTE - HPI (INCLUDE ILLNESS QUALITY, SEVERITY, DURATION, TIMING, CONTEXT, MODIFYING FACTORS, ASSOCIATED SIGNS AND SYMPTOMS)
Damián is a 62yo man (domiciled in shelter, single, retired) with PPhx of Bipolar DO and PTSD (reportedly multiple admissions, most recent several months ago; hx 2 suicide attempts) and PMHx of HTN, HLD. He was BIBEMS with complaint of suicidal ideation.    Damián was interviewed in private area on 1:1. He was calm, pleasant, easily engaged in conversation. He was not agitated and did not require IM medication. He reported feeling depressed and dispirited lately due to issues with his housing and his frustrations with his health. He stated he had recently moved shelters to [place] and was being harassed by others in his house. He stated he felt depressed regarding his HIV status as well. He disclosed thoughts of wanting to jump from a bridge or to drown himself at the beach. He stated these feelings were coming despite compliance with medication - Paxil, Zyprexa, Prolixin. He reported poor sleep and very poor appetite lately. 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 Damián expressed some ambivalence about suicide - cited his daughter and granddaughters as protective factors - but was not certain how he would improve his mood. He was in agreement to sign into hospital voluntarily.    COVID screen - vaccinated, no travel, no recent exposure, no recent infection

## 2022-08-02 NOTE — BH INPATIENT PSYCHIATRY PROGRESS NOTE - NSBHCHARTREVIEWVS_PSY_A_CORE FT
Vital Signs Last 24 Hrs  T(C): 36.7 (08-02-22 @ 07:44), Max: 36.7 (08-02-22 @ 07:44)  T(F): 98 (08-02-22 @ 07:44), Max: 98 (08-02-22 @ 07:44)  HR: 65 (08-01-22 @ 23:53) (65 - 75)  BP: 118/59 (08-01-22 @ 23:53) (118/59 - 128/69)  BP(mean): --  RR: 16 (08-02-22 @ 07:44) (14 - 16)  SpO2: 98% (08-02-22 @ 07:44) (98% - 100%)    Orthostatic VS  08-02-22 @ 07:44  Lying BP: --/-- HR: --  Sitting BP: 130/88 HR: 84  Standing BP: 104/80 HR: 100  Site: upper right arm  Mode: electronic  Orthostatic VS  08-01-22 @ 08:03  Lying BP: --/-- HR: --  Sitting BP: 135/88 HR: 81  Standing BP: 124/88 HR: 83  Site: upper right arm  Mode: electronic  
Vital Signs Last 24 Hrs  T(C): 36.5 (08-01-22 @ 08:03), Max: 36.5 (08-01-22 @ 08:03)  T(F): 97.7 (08-01-22 @ 08:03), Max: 97.7 (08-01-22 @ 08:03)  HR: --  BP: --  BP(mean): --  RR: 18 (08-01-22 @ 08:03) (18 - 18)  SpO2: 99% (08-01-22 @ 08:03) (99% - 99%)    Orthostatic VS  08-01-22 @ 08:03  Lying BP: --/-- HR: --  Sitting BP: 135/88 HR: 81  Standing BP: 124/88 HR: 83  Site: upper right arm  Mode: electronic

## 2022-08-02 NOTE — BH TREATMENT PLAN - NSTXCOPEINTERSW_PSY_ALL_CORE
Pt will follow the therapeutic environment including group and individual therapy, medication management and safety planning  as well as d/c planning. Pt will be dc'd with the appropriate level of aftercare once Pt meets tx goals for safe d/c.

## 2022-08-02 NOTE — BH DISCHARGE NOTE NURSING/SOCIAL WORK/PSYCH REHAB - NSDCADDINFO1FT_PSY_ALL_CORE
You have an appt with Dr. Archuleta for medication management on 8/11 @ 12pm. Please call # above if you need to reschedule appt for any reason. Please remember to bring your insurance cards to appt.

## 2022-08-02 NOTE — BH INPATIENT PSYCHIATRY DISCHARGE NOTE - NSDCCPCAREPLAN_GEN_ALL_CORE_FT
PRINCIPAL DISCHARGE DIAGNOSIS  Diagnosis: Bipolar disorder  Assessment and Plan of Treatment: Zyprexa 10 mg HS; Paxil 10 mg daily

## 2022-08-02 NOTE — BH INPATIENT PSYCHIATRY DISCHARGE NOTE - OTHER PAST PSYCHIATRIC HISTORY (INCLUDE DETAILS REGARDING ONSET, COURSE OF ILLNESS, INPATIENT/OUTPATIENT TREATMENT)
Patient reports mult admissions for [facility] over course of lifetime. Most recent inpt stay in [place], [place]

## 2022-08-02 NOTE — BH INPATIENT PSYCHIATRY PROGRESS NOTE - NSBHMETABOLIC_PSY_ALL_CORE_FT
BMI:   HbA1c: A1C with Estimated Average Glucose Result: 5.4 % (08-01-22 @ 07:56)    Glucose:   BP: 118/59 (08-01-22 @ 23:53) (118/59 - 128/69)  Lipid Panel: Date/Time: 08-01-22 @ 07:56  Cholesterol, Serum: 170  Direct LDL: --  HDL Cholesterol, Serum: 41  Total Cholesterol/HDL Ration Measurement: --  Triglycerides, Serum: 68  
BMI:   HbA1c:   Glucose:   BP: --  Lipid Panel:

## 2022-08-02 NOTE — BH TREATMENT PLAN - NSTXSUICIDINTERRN_PSY_ALL_CORE
Patient will be able to report that he independently used a coping skill instead of hurting himself.

## 2022-08-02 NOTE — BH INPATIENT PSYCHIATRY PROGRESS NOTE - NSCGISEVERILLNESS_PSY_ALL_CORE
6 = Severely ill - disruptive pathology, behavior and function are frequently influenced by symptoms, may require assistance from others
6 = Severely ill - disruptive pathology, behavior and function are frequently influenced by symptoms, may require assistance from others

## 2022-08-02 NOTE — BH INPATIENT PSYCHIATRY DISCHARGE NOTE - NSBHMETABOLIC_PSY_ALL_CORE_FT
HbA1c: A1C with Estimated Average Glucose Result: 5.4 % (08-01-22 @ 07:56)  BP: 118/59 (08-01-22 @ 23:53) (118/59 - 128/69)  Lipid Panel: Date/Time: 08-01-22 @ 07:56  Cholesterol, Serum: 170  Direct LDL: --115  HDL Cholesterol, Serum: 41  Triglycerides, Serum: 68

## 2022-08-02 NOTE — BH INPATIENT PSYCHIATRY PROGRESS NOTE - NSBHATTESTBILLINGAW_PSY_A_CORE
[id number] Inpatient care - moderate complexity - 25 minutes
[id number] Inpatient care - moderate complexity - 25 minutes

## 2022-08-02 NOTE — BH INPATIENT PSYCHIATRY PROGRESS NOTE - NSDCCRITERIA_PSY_ALL_CORE
Meds compliance  No SI on discharge  Stable mood with active Group Participation
Meds compliance  No SI on discharge  Stable mood with active Group Participation

## 2022-08-02 NOTE — BH INPATIENT PSYCHIATRY PROGRESS NOTE - NSBHFUPINTERVALHXFT_PSY_A_CORE
HPI: Damián is a 62 yo man (domiciled in shelter, single, retired) with PPhx of Bipolar DO and PTSD (reportedly multiple admissions, most recent several months ago; hx 2 suicide attempts) and PMHx of HTN, HLD. He was BIBEMS with complaint of suicidal ideation.    Damián was interviewed in private area on 1:1. He was calm, pleasant, easily engaged in conversation. He was not agitated and did not require IM medication. He reported feeling depressed and dispirited lately due to issues with his housing and his frustrations with his health. He stated he had recently moved shelters to [place] and was being harassed by others in his house. He stated he felt depressed regarding his HIV status as well. He disclosed thoughts of wanting to jump from a bridge or to drown himself at the beach. He stated these feelings were coming despite compliance with medication - Paxil, Zyprexa, Prolixin. He reported poor sleep and very poor appetite lately. 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 Damián expressed some ambivalence about suicide - cited his daughter and granddaughters as protective factors - but was not certain how he would improve his mood. He was in agreement to sign into hospital voluntarily.    COVID screen - vaccinated, no travel, no recent exposure, no recent infection    08/01/2022: Patient was seen yesterday on arrival and was also seen today. Mood in good control with no S/H/I/P. He also had a good sleep last night. He is more visible in unit vitals are WNL. He is meds compliant and hs no SI now. He was advised to participate in unit group activities. To continue Paxil/Zyprexa as ordered for stability. He does not need meds as he recently filled his prescription.    08/02/2022: Patient was seen today AM, chart reviewed and discussed in team. He has been absolutely doing very well on prescribed meds, no behavioral events noted, to continue the same meds as given before, has good sleep/appetite. Not S/H and no behavioral issues seen or noted during his stay here in the unit  
HPI: Damián is a 62 yo man (domiciled in shelter, single, retired) with PPhx of Bipolar DO and PTSD (reportedly multiple admissions, most recent several months ago; hx 2 suicide attempts) and PMHx of HTN, HLD. He was BIBEMS with complaint of suicidal ideation.    Damián was interviewed in private area on 1:1. He was calm, pleasant, easily engaged in conversation. He was not agitated and did not require IM medication. He reported feeling depressed and dispirited lately due to issues with his housing and his frustrations with his health. He stated he had recently moved shelters to [place] and was being harassed by others in his house. He stated he felt depressed regarding his HIV status as well. He disclosed thoughts of wanting to jump from a bridge or to drown himself at the beach. He stated these feelings were coming despite compliance with medication - Paxil, Zyprexa, Prolixin. He reported poor sleep and very poor appetite lately. 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 Damián expressed some ambivalence about suicide - cited his daughter and granddaughters as protective factors - but was not certain how he would improve his mood. He was in agreement to sign into hospital voluntarily.    COVID screen - vaccinated, no travel, no recent exposure, no recent infection    08/01/2022: Patient was seen yesterday on arrival and was also seen today. Mood in good control with no S/H/I/P. He also had a good sleep last night. He is more visible in unit vitals are WNL. He is meds compliant and hs no SI now. He was advised to participate in unit group activities. To continue Paxil/Zyprexa as ordered for stability. He does not need meds as he recently filled his prescription.

## 2022-08-02 NOTE — BH DISCHARGE NOTE NURSING/SOCIAL WORK/PSYCH REHAB - PATIENT PORTAL LINK FT
You can access the FollowMyHealth Patient Portal offered by [facility] by registering at the following website: [email or web address]. By joining [employer]’s FollowMyHealth portal, you will also be able to view your health information using other applications (apps) compatible with our system.

## 2022-08-02 NOTE — BH DISCHARGE NOTE NURSING/SOCIAL WORK/PSYCH REHAB - NSCDUDCCRISIS_PSY_A_CORE
[facility] Well  1 ([phone]) [facility]-WELL ([phone])  Text "WELL" to 79945  Website: www.GoEuro/.[facility] Horizons 1 (123) 621-HOPE (1365) Website: www.safehorizon.org/.  [facility] – Crisis Care for Children, Adults and Families  [address] [place] [address]  Mobile Crisis Hotline – (170) 178-2105/.National Suicide Prevention Lifeline 5 (716) 551-5472/.  [facility]  1 (351) LIFENET (815-2138)/.  [facility]  (760) 469-5220/.  [facility] Behavioral Health Helpline / [facility] Mobile Crisis  (969) 607-LGYF (3635)/.  [facility] Response Crisis Hotline  (249) 710-5050  24 hour telephone crisis intervention and suicide prevention hotline concerned with all mental health issues/.  [facility]’s Behavioral Health Crisis Center  [address] [place] [address] [phone]   Hours:  Monday through Friday from 9 AM to 3 PM/Other.../.  [facility] Child Crisis [facility]  [address] [place] [address] [phone]   Hours: Monday through Friday from 10 AM to 4 PM 5N RN Mgr Artemio Rosenbaum [phone]/[facility] Well  1 (537) [facility]-WELL ([phone])  Text "WELL" to 61697  Website: www.EggCartel/.Safe Horizons 1 (192) 621-HOPE (3705) Website: www.safehorizon.org/.  [facility] – Crisis Care for Children, Adults and Families  [address] [place] [address]  Mobile Crisis Hotline – (912) 543-2848/.National Suicide Prevention Lifeline 1 (072) 561-2152/.  [facility]  1 (269) LIFENET (331-5258)/.  [facility]  (331) 715-5583/.  [facility] Behavioral Health Helpline / [facility] Mobile Crisis  (325) 211-CRQJ (9410)/.  [facility] Response Crisis Hotline  (225) 183-8862  24 hour telephone crisis intervention and suicide prevention hotline concerned with all mental health issues/.  [facility] Behavioral Health Crisis Center  [address] [place] [address] [phone]   Hours:  Monday through Friday from 9 AM to 3 PM DarileaN RN Mgr Artemio Rosenbaum [phone]/[facility] Well  1 (055) [facility]-WELL ([phone])  Text "WELL" to 33401  Website: www.FanGo/.[facility] Horizons 1 (355) 301HOPE (3035) Website: www.safehorizon.org/.National Suicide Prevention Lifeline 2 (807) 183-1251/.  [facility]  1 (939) LIFENET (414-2403)/.  [facility] Behavioral Health Crisis Center  [address] [place] [address] [phone]   Hours:  Monday through Friday from 9 AM to 3 PM/.  [facility] Crisis Line  7 (480) 419-4805, Option 1

## 2022-08-02 NOTE — BH INPATIENT PSYCHIATRY PROGRESS NOTE - NSBHCHARTREVIEWINVESTIGATE_PSY_A_CORE FT
< from: 12 Lead ECG (07.31.22 @ 01:13) >    Ventricular Rate 75 BPM    Atrial Rate 75 BPM    P-R Interval 178 ms    QRS Duration 102 ms    Q-T Interval 418 ms    QTC Calculation(Bazett) 466 ms    P Axis 42 degrees    R Axis 41 degrees    T Axis 43 degrees    Diagnosis Line Normal sinus rhythm  Normal ECG    Confirmed by Joel Hernandez ([id number]) on 7/31/2022 9:40:22 PM    < end of copied text >    
< from: 12 Lead ECG (07.31.22 @ 01:13) >    Ventricular Rate 75 BPM    Atrial Rate 75 BPM    P-R Interval 178 ms    QRS Duration 102 ms    Q-T Interval 418 ms    QTC Calculation(Bazett) 466 ms    P Axis 42 degrees    R Axis 41 degrees    T Axis 43 degrees    Diagnosis Line Normal sinus rhythm  Normal ECG    Confirmed by Joel Hernandez ([id number]) on 7/31/2022 9:40:22 PM    < end of copied text >

## 2022-08-03 NOTE — CHART NOTE - NSCHARTNOTEFT_GEN_A_CORE
Phone number left by patient is not accepting calls at this time.  Letter to be sent to address on file.

## 2022-08-11 ENCOUNTER — APPOINTMENT (OUTPATIENT)
Dept: PSYCHIATRY | Facility: CLINIC | Age: 62
End: 2022-08-11

## 2022-08-11 NOTE — BH SOCIAL WORK CONFIRMATION FOLLOW UP NOTE - NSCOMMENTS_PSY_ALL_CORE
Rec'd call from [facility] outpt. that pt. did not link but that outpt. MD made contact with pt. who reports [place] w/ friend and reported housing issues. Outpt.  would outreach again to see about re-scheduling. LEONA ABREU for pt. to f/u and requested call back. Will send letter to address on file. Notified inpt. tx. team and MD advised no further intervention needed at this time.

## 2022-08-17 LAB
25(OH)D3 SERPL-MCNC: 66 NG/ML
ALBUMIN SERPL ELPH-MCNC: 4.7 G/DL
ALP BLD-CCNC: 76 U/L
ALT SERPL-CCNC: 25 U/L
AMPHET UR-MCNC: NEGATIVE
ANION GAP SERPL CALC-SCNC: 14 MMOL/L
APPEARANCE: CLEAR
AST SERPL-CCNC: 21 U/L
BARBITURATES UR-MCNC: NEGATIVE
BASOPHILS # BLD AUTO: 0.06 K/UL
BASOPHILS NFR BLD AUTO: 0.8 %
BENZODIAZ UR-MCNC: NEGATIVE
BILIRUB SERPL-MCNC: 0.3 MG/DL
BILIRUBIN URINE: NEGATIVE
BLOOD URINE: NEGATIVE
BUN SERPL-MCNC: 12 MG/DL
C TRACH RRNA SPEC QL NAA+PROBE: NOT DETECTED
CALCIUM SERPL-MCNC: 9.3 MG/DL
CD16+CD56+ CELLS # BLD: 426 /UL
CD16+CD56+ CELLS NFR BLD: 19 %
CD19 CELLS NFR BLD: 423 /UL
CD3 CELLS # BLD: 1323 /UL
CD3 CELLS NFR BLD: 60 %
CD3+CD4+ CELLS # BLD: 847 /UL
CD3+CD4+ CELLS NFR BLD: 38 %
CD3+CD4+ CELLS/CD3+CD8+ CLL SPEC: 1.82 RATIO
CD3+CD8+ CELLS # SPEC: 465 /UL
CD3+CD8+ CELLS NFR BLD: 21 %
CELLS.CD3-CD19+/CELLS IN BLOOD: 19 %
CHLORIDE SERPL-SCNC: 108 MMOL/L
CHOLEST SERPL-MCNC: 175 MG/DL
CO2 SERPL-SCNC: 19 MMOL/L
COCAINE METAB.OTHER UR-MCNC: NEGATIVE
COLOR: YELLOW
CREAT SERPL-MCNC: 1 MG/DL
CREATININE, URINE: 254.6 MG/DL
EGFR: 87 ML/MIN/1.73M2
EOSINOPHIL # BLD AUTO: 0.1 K/UL
EOSINOPHIL NFR BLD AUTO: 1.3 %
ESTIMATED AVERAGE GLUCOSE: 105 MG/DL
FOLATE SERPL-MCNC: 6.2 NG/ML
GLUCOSE QUALITATIVE U: NEGATIVE
GLUCOSE SERPL-MCNC: 85 MG/DL
HBA1C MFR BLD HPLC: 5.3 %
HBV CORE IGG+IGM SER QL: NONREACTIVE
HBV SURFACE AB SERPL IA-ACNC: 46.9 MIU/ML
HBV SURFACE AG SER QL: NONREACTIVE
HCT VFR BLD CALC: 42.2 %
HCV AB SER QL: REACTIVE
HCV RNA FLD QL NAA+PROBE: NORMAL
HCV RNA SPEC QL PROBE+SIG AMP: NOT DETECTED
HCV S/CO RATIO: 12.3 S/CO
HDLC SERPL-MCNC: 38 MG/DL
HEPATITIS A IGG ANTIBODY: REACTIVE
HGB BLD-MCNC: 14.4 G/DL
HIV1 RNA # SERPL NAA+PROBE: <20 COPIES/ML
IMM GRANULOCYTES NFR BLD AUTO: 0.3 %
KETONES URINE: NEGATIVE
LDH SERPL-CCNC: 205 IU/L
LDH1 CFR SERPL ELPH: 21 %
LDH2 CFR SERPL ELPH: 30 %
LDH3 CFR SERPL ELPH: 23 %
LDH4 CFR SERPL ELPH: 11 %
LDH5 CFR SERPL ELPH: 15 %
LDLC SERPL CALC-MCNC: 117 MG/DL
LEUKOCYTE ESTERASE URINE: NEGATIVE
LYMPHOCYTES # BLD AUTO: 2.01 K/UL
LYMPHOCYTES NFR BLD AUTO: 26.3 %
M TB IFN-G BLD-IMP: NEGATIVE
MAN DIFF?: NORMAL
MCHC RBC-ENTMCNC: 31.1 PG
MCHC RBC-ENTMCNC: 34.1 G/DL
MCV RBC AUTO: 91.1 FL
METHADONE UR-MCNC: NEGATIVE
METHAQUALONE UR-MCNC: NEGATIVE
MONOCYTES # BLD AUTO: 0.88 K/UL
MONOCYTES NFR BLD AUTO: 11.5 %
N GONORRHOEA RRNA SPEC QL NAA+PROBE: NOT DETECTED
NEUTROPHILS # BLD AUTO: 4.56 K/UL
NEUTROPHILS NFR BLD AUTO: 59.8 %
NITRITE URINE: NEGATIVE
NONHDLC SERPL-MCNC: 137 MG/DL
OPIATES UR-MCNC: NEGATIVE
PCP UR-MCNC: NEGATIVE
PH URINE: 5.5
PLATELET # BLD AUTO: 259 K/UL
POTASSIUM SERPL-SCNC: 4.3 MMOL/L
PROPOXYPH UR QL: NEGATIVE
PROT SERPL-MCNC: 7.3 G/DL
PROTEIN URINE: NORMAL
PSA SERPL-MCNC: 0.49 NG/ML
QUANTIFERON TB PLUS MITOGEN MINUS NIL: >10 IU/ML
QUANTIFERON TB PLUS NIL: 0.01 IU/ML
QUANTIFERON TB PLUS TB1 MINUS NIL: 0 IU/ML
QUANTIFERON TB PLUS TB2 MINUS NIL: 0 IU/ML
RBC # BLD: 4.63 M/UL
RBC # FLD: 13.8 %
RPR SER-TITR: NORMAL
SODIUM SERPL-SCNC: 141 MMOL/L
SOURCE AMPLIFICATION: NORMAL
SPECIFIC GRAVITY URINE: 1.03
T4 SERPL-MCNC: 8 UG/DL
THC UR QL: NEGATIVE
TRIGL SERPL-MCNC: 100 MG/DL
TSH SERPL-ACNC: 0.55 UIU/ML
UROBILINOGEN URINE: NORMAL
VIRAL LOAD INTERP: DETECTED COPIES/ML
VIRAL LOAD LOG: <1.3 LOGCOPIES/ML
VIT B12 SERPL-MCNC: 603 PG/ML
WBC # FLD AUTO: 7.63 K/UL

## 2022-08-25 ENCOUNTER — APPOINTMENT (OUTPATIENT)
Dept: INFECTIOUS DISEASE | Facility: CLINIC | Age: 62
End: 2022-08-25

## 2022-09-14 ENCOUNTER — NON-APPOINTMENT (OUTPATIENT)
Age: 62
End: 2022-09-14

## 2022-09-15 ENCOUNTER — NON-APPOINTMENT (OUTPATIENT)
Age: 62
End: 2022-09-15

## 2024-01-24 ENCOUNTER — RX RENEWAL (OUTPATIENT)
Age: 64
End: 2024-01-24

## 2024-01-24 RX ORDER — BICTEGRAVIR SODIUM, EMTRICITABINE, AND TENOFOVIR ALAFENAMIDE FUMARATE 50; 200; 25 MG/1; MG/1; MG/1
50-200-25 TABLET ORAL
Qty: 30 | Refills: 5 | Status: ACTIVE | COMMUNITY
Start: 2022-07-28 | End: 1900-01-01

## 2024-05-20 NOTE — BH INPATIENT PSYCHIATRY PROGRESS NOTE - NSTXCOPEDATETRGT_PSY_ALL_CORE
Patient has an ASAP Order to be seen in GI for anoscopy/sigmoidoscopy.  Please assist in scheduling.  Routing message to on call GI provider's team.    
Per VO Dr Hurtado, pt is to be scoped ASAP with any gastroenterologist available next week and/or recommend general surgeon Dr Russ, d/t possible obstruction and/or risk of perforation.Dr Hurtado will be out of office next week.       
Spoke with Dr. Lane, who reviewed the messages below, he does not feel that pt is currently obstructed.  Pt should be seen in the clinic with Dr. Lane for a follow-up next week, appointment scheduled for 5/22/2024 at 0900 at [place]. Left a message for pt regarding the follow-up appointment and stating that if he should develop nausea/vomiting, inability to keep food/liquids down, stops passing gas, abdominal pain that he needs to be evaluated in the ED.   
Urgent service to Gastro placed per Dr Mcgraw (oncologist) on 05/16, for:  Diagnosis   K59.00 (ICD-10-CM) - Constipation, unspecified constipation type   C20 (ICD-10-CM) - Adenocarcinoma of rectum  (CMD)       GI hx stage 3 rectal cancer, liver lesion  Non est [facility] GI pt    Colon w/ Dr Lane 07/28/23    MRI rectum and CT chest abd pelvis 01/31/24    Pending MRI liver and CT chest abd pelvis - scheduled for 06/11/24    Per EMR review, pt did not pursue surgical resection d/t concerns requiring ostomy bag    Per 05/15 OV with Dr Carlson:  3 week h/o difficulty passing stool. No constipation, stool is soft, but he has to lean forward and push hard to get stool out. Goes about three times per day. No blood or melena. During this same three weeks has has noticed intermittent aching in the groin area. No swelling or pain but sometimes testicles seem sore. Exam normal. I am wondering if this might be related to XRT, perhaps rectal or distal colon stenosis. I am going to reach out to Madhavi Mcgraw and Tatianna to see if the agree and for recommendation on how best to proceed.       Multiple attempts to reach pt on mobile #, spouse's #. Also left vm on pt's home # to return call to clinic for further assesment as well      Per 05/15 [place] TE, pt advised to start   \"Senna daily PRN to allow for a bowel movement once a day or every other day. Additionally, patient would like to pursue a diagnostic procedure. He is requesting to be seen at [place]. Message sent to Dr. Mcgraw to place order\"       Routed to Dr Hurtado/ MICHAEL Langley to further advise at this time   
Writer contacted patient to inquire if he listened to voicemail about prep for upcoming appointment with Dr. Lane. Writer informed that message was received from Dr. Hurtado's team to have patient follow up to be scoped. Patient stated that he wouldn't be seeing Dr. Lane but another doctor. Writer inquired if he was suppose to be seeing Dr. Russ or another provider in the [facility] system. Patient stated that it wasn't Dr. Russ but doesn't remember the doctor's name and doesn't know if he would be getting or needing a scope. Writer inquired if he wanted to keep the appointment just in case. Patient requests that appointment with Dr. Lane be cancelled.    Appointment cancelled.  
Writer received update from staff member:  Per  patient is to be scoped ASAP with any gastroenterologist available next week and/or recommend general surgeon Dr Russ, d/t possible obstruction and/or risk of perforation.     Routed providers who scope at [facility].     Writer will route to Dr. Nassar if ok to add onto his schedule while on call next week.    
Writer spoke to patient to get clarification on cancelling procedure with Dr. Lane & needing a procedure done.      Patient explained he cancelled the follow up with Dr. Lane, because he was told by Dr. Mcgraw he needs a procedure.   Writer explained to him that a lot of team members were involved with this & he received a call from RN with Dr. Russ's team to triage his symptoms.   She reviewed them with Dr. Lane & Dr. Lane felt an office visit would have been appropriate.       Patient informed writer, now that he has had more time to think about it, he does not want a GI procedure until after he has the MRI & CT done on 6/11/24.   Once that is done, they can figure out if he still needs the procedure or not.      They had no other questions or concerns at this time.  Writer thanked them for their time.  Nothing else needed at this time.       Writer will update all team members in a message.    
Writer spoke to patient to get clarification on what is needed.    Writer left voicemail for patient to return call regarding looking to clarify the status of his symptoms & why he cancelled his appointment with Dr. Lane.   Patient can return call to office at [phone].   Writer awaiting call back from patient.   
06-Aug-2022
06-Aug-2022

## 2024-06-20 NOTE — BH SOCIAL WORK INITIAL PSYCHOSOCIAL EVALUATION - NSBHHOUSERETURN_PSY_ALL_CORE
Problem: Occupational Therapy  Goal: Occupational Therapy Goal  Description: Goals to be met by: 7/4/24     Patient will increase functional independence with ADLs by performing:    LE Dressing with Supervision.  Grooming while standing at sink with Supervision.  Toileting from toilet with Supervision for hygiene and clothing management.   Supine to sit with Supervision.  Toilet transfer to toilet with Supervision.  [place] with BUE HEP to improve activity tolerance to complete ADLs and IADLs  Within home ambulation distances with  supervision and LRAD necessary to complete ADLs.  ;  Outcome: Progressing     Patient tolerated OT eval. Goals and POC established. See note for further details.     Yes

## 2024-07-23 NOTE — BH SOCIAL WORK INITIAL PSYCHOSOCIAL EVALUATION - NSBHSPIRITUALPOWER_PSY_ALL_CORE
Munir. From 2/14/24 neurology visit I see a diagnosis of Huntingtons disease. Can you add that into his list of diagnosis?  Yes

## 2025-03-19 NOTE — BH SOCIAL WORK INITIAL PSYCHOSOCIAL EVALUATION - NSBHTREATHX_PSY_ALL_CORE
PRE-SEDATION ASSESSMENT    CONSENT  Risks, benefits, and alternatives have been discussed with the patient/patient representative, and patient/patient representative agrees to proceed: Yes    MEDICAL HISTORY  Continues to have LBP that radiates to the leg with numbness in the leg. PE noted positive dural tension test.        PHYSICAL EXAM  History and Physical Reviewed: H&P completed today  Airway Anatomy : Class III  Heart : Normal  Lungs : Normal  LOC/Mental Status : Normal    OTHER FINDINGS  Reviewed current medications and allergies: Yes  Pertinent lab/diagnostic test reviewed: Yes    SEDATION RISK ASSESSMENT  Risk Status ASA: Class II - Normal patient with mild systemic disease  Plan for Sedation: Moderate Sedation  EKG Monitoring: No    Diagnosis/procedure: lumbar radiculopathy/DA    NARRATIVE FINDINGS      Yes...